# Patient Record
Sex: FEMALE | URBAN - METROPOLITAN AREA
[De-identification: names, ages, dates, MRNs, and addresses within clinical notes are randomized per-mention and may not be internally consistent; named-entity substitution may affect disease eponyms.]

---

## 2021-01-15 ENCOUNTER — APPOINTMENT (OUTPATIENT)
Dept: URBAN - METROPOLITAN AREA CLINIC 254 | Age: 21
Setting detail: DERMATOLOGY
End: 2021-01-15

## 2021-01-15 VITALS — WEIGHT: 240 LBS | HEIGHT: 62 IN | TEMPERATURE: 98.6 F | RESPIRATION RATE: 16 BRPM

## 2021-01-15 DIAGNOSIS — Q819 OTHER SPECIFIED ANOMALIES OF SKIN: ICD-10-CM

## 2021-01-15 DIAGNOSIS — L71.8 OTHER ROSACEA: ICD-10-CM

## 2021-01-15 DIAGNOSIS — Q826 OTHER SPECIFIED ANOMALIES OF SKIN: ICD-10-CM

## 2021-01-15 DIAGNOSIS — Q828 OTHER SPECIFIED ANOMALIES OF SKIN: ICD-10-CM

## 2021-01-15 PROBLEM — L85.8 OTHER SPECIFIED EPIDERMAL THICKENING: Status: ACTIVE | Noted: 2021-01-15

## 2021-01-15 PROCEDURE — 99204 OFFICE O/P NEW MOD 45 MIN: CPT

## 2021-01-15 PROCEDURE — OTHER PRESCRIPTION: OTHER

## 2021-01-15 PROCEDURE — OTHER COUNSELING: OTHER

## 2021-01-15 RX ORDER — IVERMECTIN 10 MG/G
1% CREAM TOPICAL QD
Qty: 1 | Refills: 3 | Status: ERX | COMMUNITY
Start: 2021-01-15

## 2021-01-15 RX ORDER — SODIUM SULFACETAMIDE, SULFUR 50; 100 MG/G; MG/G
LOTION TOPICAL BID
Qty: 1 | Refills: 3 | Status: ERX | COMMUNITY
Start: 2021-01-15

## 2021-01-15 RX ORDER — UREA 410 MG/G
41% CREAM TOPICAL QD
Qty: 1 | Refills: 3 | Status: ERX | COMMUNITY
Start: 2021-01-15

## 2021-01-15 RX ORDER — METRONIDAZOLE 10 MG/G
GEL TOPICAL QHS
Qty: 1 | Refills: 3 | Status: ERX | COMMUNITY
Start: 2021-01-15

## 2021-01-15 RX ORDER — TRETIONIN 0.25 MG/G
0.025% CREAM TOPICAL QHS
Qty: 1 | Refills: 3 | Status: ERX | COMMUNITY
Start: 2021-01-15

## 2021-01-15 RX ORDER — AZELAIC ACID 0.2 G/G
20% CREAM CUTANEOUS QHS
Qty: 1 | Refills: 3 | Status: ERX | COMMUNITY
Start: 2021-01-15

## 2021-01-15 ASSESSMENT — BSA RASH: BSA RASH: 18

## 2021-01-15 ASSESSMENT — LOCATION SIMPLE DESCRIPTION DERM
LOCATION SIMPLE: LEFT UPPER ARM
LOCATION SIMPLE: RIGHT UPPER ARM
LOCATION SIMPLE: LEFT FOREARM
LOCATION SIMPLE: LEFT CHEEK
LOCATION SIMPLE: RIGHT FOREARM
LOCATION SIMPLE: RIGHT CHEEK

## 2021-01-15 ASSESSMENT — LOCATION DETAILED DESCRIPTION DERM
LOCATION DETAILED: RIGHT INFERIOR CENTRAL MALAR CHEEK
LOCATION DETAILED: LEFT SUPERIOR LATERAL BUCCAL CHEEK
LOCATION DETAILED: RIGHT PROXIMAL DORSAL FOREARM
LOCATION DETAILED: LEFT PROXIMAL DORSAL FOREARM
LOCATION DETAILED: RIGHT SUPERIOR LATERAL BUCCAL CHEEK
LOCATION DETAILED: RIGHT ANTERIOR DISTAL UPPER ARM
LOCATION DETAILED: LEFT INFERIOR CENTRAL MALAR CHEEK
LOCATION DETAILED: LEFT ANTERIOR DISTAL UPPER ARM

## 2021-01-15 ASSESSMENT — SEVERITY ASSESSMENT: SEVERITY: MILD TO MODERATE

## 2021-01-15 ASSESSMENT — LOCATION ZONE DERM
LOCATION ZONE: FACE
LOCATION ZONE: ARM

## 2021-01-15 ASSESSMENT — SEVERITY ASSESSMENT OVERALL AMONG ALL PATIENTS
IN YOUR EXPERIENCE, AMONG ALL PATIENTS YOU HAVE SEEN WITH THIS CONDITION, HOW SEVERE IS THIS PATIENT'S CONDITION?: MODERATE

## 2021-01-28 ENCOUNTER — RX ONLY (RX ONLY)
Age: 21
End: 2021-01-28

## 2021-01-28 RX ORDER — AZELAIC ACID 0.2 G/G
20% CREAM CUTANEOUS QHS
Qty: 1 | Refills: 3 | Status: ERX

## 2021-02-25 ENCOUNTER — APPOINTMENT (OUTPATIENT)
Dept: URBAN - METROPOLITAN AREA CLINIC 254 | Age: 21
Setting detail: DERMATOLOGY
End: 2021-03-01

## 2021-02-25 VITALS — HEIGHT: 62 IN | WEIGHT: 260 LBS

## 2021-02-25 DIAGNOSIS — L73.2 HIDRADENITIS SUPPURATIVA: ICD-10-CM

## 2021-02-25 DIAGNOSIS — L71.8 OTHER ROSACEA: ICD-10-CM

## 2021-02-25 PROCEDURE — OTHER ORDER TESTS: OTHER

## 2021-02-25 PROCEDURE — 36415 COLL VENOUS BLD VENIPUNCTURE: CPT

## 2021-02-25 PROCEDURE — OTHER HUMIRA INITIATION: OTHER

## 2021-02-25 PROCEDURE — 99214 OFFICE O/P EST MOD 30 MIN: CPT | Mod: 25

## 2021-02-25 PROCEDURE — OTHER VENIPUNCTURE: OTHER

## 2021-02-25 PROCEDURE — OTHER PRESCRIPTION: OTHER

## 2021-02-25 PROCEDURE — OTHER COUNSELING: OTHER

## 2021-02-25 RX ORDER — ADALIMUMAB 4 MG/ML
KIT INJECTION
Qty: 1 | Refills: 0 | Status: ERX | COMMUNITY
Start: 2021-02-25

## 2021-02-25 RX ORDER — ADALIMUMAB 40MG/0.4ML
40MG KIT SUBCUTANEOUS
Qty: 2 | Refills: 0 | Status: ERX | COMMUNITY
Start: 2021-02-25

## 2021-02-25 RX ORDER — DOXYCYCLINE 100 MG/1
CAPSULE ORAL BID
Qty: 60 | Refills: 1 | Status: ERX | COMMUNITY
Start: 2021-02-25

## 2021-02-25 RX ORDER — OXYMETAZOLINE HYDROCHLORIDE 1 G/100G
CREAM TOPICAL PRN
Qty: 1 | Refills: 3 | Status: ERX | COMMUNITY
Start: 2021-02-25

## 2021-02-25 ASSESSMENT — LOCATION SIMPLE DESCRIPTION DERM
LOCATION SIMPLE: LEFT AXILLARY VAULT
LOCATION SIMPLE: RIGHT CHEEK
LOCATION SIMPLE: CHEST
LOCATION SIMPLE: LEFT UPPER ARM
LOCATION SIMPLE: LEFT CHEEK
LOCATION SIMPLE: RIGHT AXILLARY VAULT

## 2021-02-25 ASSESSMENT — LOCATION DETAILED DESCRIPTION DERM
LOCATION DETAILED: RIGHT AXILLARY VAULT
LOCATION DETAILED: LEFT ANTECUBITAL SKIN
LOCATION DETAILED: LEFT INFERIOR CENTRAL MALAR CHEEK
LOCATION DETAILED: RIGHT INFERIOR CENTRAL MALAR CHEEK
LOCATION DETAILED: LOWER STERNUM
LOCATION DETAILED: LEFT AXILLARY VAULT

## 2021-02-25 ASSESSMENT — LOCATION ZONE DERM
LOCATION ZONE: TRUNK
LOCATION ZONE: ARM
LOCATION ZONE: AXILLAE
LOCATION ZONE: FACE

## 2021-02-25 NOTE — PROCEDURE: VENIPUNCTURE
Bill For Individual Tests Below?: no
Detail Level: None
Number Of Tubes Drawn: 3
Venipuncture Paragraph: An alcohol pad was applied to the venipuncture site. Venipuncture was performed using a butterfly needle. Pressure and a bandaid was applied to the site. No complications were noted.

## 2021-02-25 NOTE — PROCEDURE: HUMIRA INITIATION
Is Dapsone Contraindicated?: No
Pregnancy And Lactation Warning Text: This medication is Pregnancy Category B and is considered safe during pregnancy. It is unknown if this medication is excreted in breast milk.
Diagnosis (Required): Hidradenitis Suppurativa
Detail Level: Simple
Humira Dosing: 160 mg SC day 1, 80 mg SC day 14, then 40 mg SC every other week starting on day 29
Humira Monitoring Guidelines: - Discussed that a panel of labs need to be drawn at baseline and monitored periodically. \\n- Additionally, the patient will need to be screened for tuberculosis and Hepatitis B annually including at baseline.\\n- It is important to not take drug holidays unless otherwise instructed as this can affect a patient's ability to recapture the same degree of skin clearance upon restarting.

## 2021-03-02 ENCOUNTER — AMBULATORY - HEALTHEAST (OUTPATIENT)
Dept: NURSING | Facility: CLINIC | Age: 21
End: 2021-03-02

## 2021-03-08 ENCOUNTER — APPOINTMENT (OUTPATIENT)
Dept: URBAN - METROPOLITAN AREA CLINIC 252 | Age: 21
Setting detail: DERMATOLOGY
End: 2021-03-08

## 2021-03-08 DIAGNOSIS — Z41.9 ENCOUNTER FOR PROCEDURE FOR PURPOSES OTHER THAN REMEDYING HEALTH STATE, UNSPECIFIED: ICD-10-CM

## 2021-03-08 PROCEDURE — OTHER JUVEDERM VOLLURE XC INJECTION: OTHER

## 2021-03-08 NOTE — PROCEDURE: JUVEDERM VOLLURE XC INJECTION
Price (Use Numbers Only, No Special Characters Or $): 147 Price (Use Numbers Only, No Special Characters Or $): 888

## 2021-03-08 NOTE — PROCEDURE: JUVEDERM VOLLURE XC INJECTION
Post-Care Instructions: - Patient was instructed to apply ice to reduce swelling. Ice should not be appleid directly to the skin.\\n\\n(1 Vollure 1.0cc syringe)\\n\\nPrice: $750

## 2021-03-08 NOTE — HPI: COSMETIC (FILLERS)
Additional History: First filler today. She reports having larger lips in morning that deflate throughout the day.

## 2021-03-08 NOTE — PROCEDURE: JUVEDERM VOLLURE XC INJECTION
Consent: - Verbal and written consent obtained after discussion of risks.\\n- Risks include but not limited to bruising, bleeding, asymmetry, infection, allergic reaction, palpation of product, lumps, scar formation, Anna effect, incomplete augmentation, temporary nature, procedural pain, ulceration, and necrosis of skin.

## 2021-03-09 RX ORDER — ADALIMUMAB 40MG/0.4ML
KIT SUBCUTANEOUS
Qty: 2 | Refills: 0 | Status: ERX

## 2021-03-09 RX ORDER — ADALIMUMAB 4 MG/ML
KIT INJECTION
Qty: 1 | Refills: 0 | Status: ERX

## 2021-03-23 ENCOUNTER — AMBULATORY - HEALTHEAST (OUTPATIENT)
Dept: NURSING | Facility: CLINIC | Age: 21
End: 2021-03-23

## 2021-04-09 ENCOUNTER — RX ONLY (RX ONLY)
Age: 21
End: 2021-04-09

## 2021-04-09 RX ORDER — ADALIMUMAB 40MG/0.4ML
KIT SUBCUTANEOUS
Qty: 2 | Refills: 1 | Status: ERX

## 2021-04-15 ENCOUNTER — APPOINTMENT (OUTPATIENT)
Dept: URBAN - METROPOLITAN AREA CLINIC 254 | Age: 21
Setting detail: DERMATOLOGY
End: 2021-04-16

## 2021-04-15 DIAGNOSIS — L73.2 HIDRADENITIS SUPPURATIVA: ICD-10-CM

## 2021-04-15 DIAGNOSIS — L64.8 OTHER ANDROGENIC ALOPECIA: ICD-10-CM

## 2021-04-15 PROCEDURE — 99213 OFFICE O/P EST LOW 20 MIN: CPT

## 2021-04-15 PROCEDURE — OTHER HUMIRA MONITORING: OTHER

## 2021-04-15 PROCEDURE — OTHER PRESCRIPTION: OTHER

## 2021-04-15 PROCEDURE — OTHER COUNSELING: OTHER

## 2021-04-15 RX ORDER — SPIRONOLACTONE 50 MG/1
50 MG TABLET, FILM COATED ORAL TWICE A DAY
Qty: 180 | Refills: 1 | Status: ERX | COMMUNITY
Start: 2021-04-15

## 2021-04-15 RX ORDER — ADALIMUMAB 40MG/0.4ML
40MG KIT SUBCUTANEOUS
Qty: 6 | Refills: 0 | Status: ERX

## 2021-04-15 ASSESSMENT — LOCATION SIMPLE DESCRIPTION DERM: LOCATION SIMPLE: LEFT SCALP

## 2021-04-15 ASSESSMENT — LOCATION DETAILED DESCRIPTION DERM: LOCATION DETAILED: LEFT MEDIAL FRONTAL SCALP

## 2021-04-15 ASSESSMENT — LOCATION ZONE DERM: LOCATION ZONE: SCALP

## 2021-04-23 ENCOUNTER — RX ONLY (RX ONLY)
Age: 21
End: 2021-04-23

## 2021-04-23 RX ORDER — BIMATOPROST 0.3 MG/ML
0.03% SOLUTION/ DROPS OPHTHALMIC QHS
Qty: 1 | Refills: 5 | Status: ERX | COMMUNITY
Start: 2021-04-23

## 2021-05-10 ENCOUNTER — APPOINTMENT (OUTPATIENT)
Dept: URBAN - METROPOLITAN AREA CLINIC 252 | Age: 21
Setting detail: DERMATOLOGY
End: 2021-05-10

## 2021-05-10 DIAGNOSIS — Z41.9 ENCOUNTER FOR PROCEDURE FOR PURPOSES OTHER THAN REMEDYING HEALTH STATE, UNSPECIFIED: ICD-10-CM

## 2021-05-10 PROCEDURE — OTHER BOTOX (U OR CC): OTHER

## 2021-05-10 NOTE — HPI: COSMETIC (BOTOX)
Additional History: She had gotten 6u Botox in past that worked well on upper lip for her gummy smile.

## 2021-05-10 NOTE — PROCEDURE: BOTOX (U OR CC)
Post-Care Instructions: - Skin was cleansed with 70% isopropyl alcohol prior to injection today.\\n- Patient was instructed to not lie down for touch area for 6 hrs.\\n\\nTOTAL BOTOX USED TODAY: 4 units\\n\\nCOLOR KEY FOR DIAGRAM BELOW:\\nRed = 1/2 unit of Botox per injection\\nOrange = 1 units of Botox per injection\\nYellow = 2 units of Botox per injection\\nGreen = 3 units of Botox per injection\\nBlue = 4 units of Botox per injection\\nBrown = 5 units of Botox per injection\\nBlack = 6 units of Botox per injection

## 2021-07-16 ENCOUNTER — RX ONLY (RX ONLY)
Age: 21
End: 2021-07-16

## 2021-07-16 RX ORDER — DAPSONE 50 MG/G
GEL TOPICAL
Qty: 60 | Refills: 11 | Status: ERX | COMMUNITY
Start: 2021-07-16

## 2021-09-24 ENCOUNTER — APPOINTMENT (OUTPATIENT)
Dept: URBAN - METROPOLITAN AREA CLINIC 252 | Age: 21
Setting detail: DERMATOLOGY
End: 2021-09-24

## 2021-10-07 ENCOUNTER — RX ONLY (RX ONLY)
Age: 21
End: 2021-10-07

## 2021-10-07 RX ORDER — DOXYCYCLINE 100 MG/1
CAPSULE ORAL
Qty: 180 | Refills: 1 | Status: ERX | COMMUNITY
Start: 2021-10-07

## 2021-10-16 ENCOUNTER — HEALTH MAINTENANCE LETTER (OUTPATIENT)
Age: 21
End: 2021-10-16

## 2021-11-23 ENCOUNTER — APPOINTMENT (OUTPATIENT)
Dept: URBAN - METROPOLITAN AREA CLINIC 252 | Age: 21
Setting detail: DERMATOLOGY
End: 2021-11-23

## 2021-11-23 ENCOUNTER — RX ONLY (RX ONLY)
Age: 21
End: 2021-11-23

## 2021-11-23 RX ORDER — DOXYCYCLINE 50 MG/1
CAPSULE ORAL
Qty: 60 | Refills: 1 | Status: ERX | COMMUNITY
Start: 2021-11-23

## 2021-12-14 ENCOUNTER — APPOINTMENT (OUTPATIENT)
Dept: URBAN - METROPOLITAN AREA CLINIC 252 | Age: 21
Setting detail: DERMATOLOGY
End: 2021-12-20

## 2021-12-14 DIAGNOSIS — Z41.9 ENCOUNTER FOR PROCEDURE FOR PURPOSES OTHER THAN REMEDYING HEALTH STATE, UNSPECIFIED: ICD-10-CM

## 2021-12-14 PROCEDURE — OTHER JUVEDERM VOLLURE XC INJECTION: OTHER

## 2021-12-14 NOTE — PROCEDURE: JUVEDERM VOLLURE XC INJECTION
Map Statment: See Attach Map for Complete Details
Brows Filler  Volume In Cc: 0
Filler: Juvederm Vollure XC
Include Cannula Information In Note?: No
Number Of Syringes (Required For Inventory): 1
Including Pricing Information In The Note: Yes
Detail Level: Detailed
Post-Care Instructions: - Patient was instructed to apply ice to reduce swelling. Ice should not be appleid directly to the skin.\\n\\n(1 Vollure 1.0cc syringe)\\n\\nPrice: $750
Procedural Text: - All areas were cleansed with chlorhexidine wipes prior to injection.\\n- Product was aspirated with syringe prior to each injection to reduce risk of intravascular injection.\\n- Patient was instructed to call the clinic immediately should any pain develop other than the expected tenderness at injection sites.\\n- Advised to expect swelling which can take 1-2 weeks to resolve completely. As a result any necessary touch-ups will want to be made no sooner than 2 weeks after injection.
Consent: - Verbal and written consent obtained after discussion of risks.\\n- Risks include but not limited to bruising, bleeding, asymmetry, infection, allergic reaction, palpation of product, lumps, scar formation, Anna effect, incomplete augmentation, temporary nature, procedural pain, ulceration, and necrosis of skin.

## 2022-01-04 ENCOUNTER — RX ONLY (RX ONLY)
Age: 22
End: 2022-01-04

## 2022-01-04 RX ORDER — RUXOLITINIB 15 MG/G
CREAM TOPICAL
Qty: 60 | Refills: 5 | Status: ERX | COMMUNITY
Start: 2022-01-04

## 2022-02-09 ENCOUNTER — APPOINTMENT (OUTPATIENT)
Dept: URBAN - METROPOLITAN AREA CLINIC 252 | Age: 22
Setting detail: DERMATOLOGY
End: 2022-02-09

## 2022-02-09 VITALS — WEIGHT: 240 LBS | HEIGHT: 62 IN

## 2022-03-02 ENCOUNTER — RX ONLY (RX ONLY)
Age: 22
End: 2022-03-02

## 2022-03-02 RX ORDER — AZITHROMYCIN DIHYDRATE 250 MG/1
TABLET, FILM COATED ORAL
Qty: 6 | Refills: 0 | Status: ERX | COMMUNITY
Start: 2022-03-02

## 2022-03-10 ENCOUNTER — IMMUNIZATION (OUTPATIENT)
Dept: NURSING | Facility: CLINIC | Age: 22
End: 2022-03-10
Payer: COMMERCIAL

## 2022-03-10 PROCEDURE — 0054A COVID-19,PF,PFIZER (12+ YRS): CPT

## 2022-03-10 PROCEDURE — 91305 COVID-19,PF,PFIZER (12+ YRS): CPT

## 2022-03-25 ENCOUNTER — RX ONLY (RX ONLY)
Age: 22
End: 2022-03-25

## 2022-03-25 ENCOUNTER — APPOINTMENT (OUTPATIENT)
Dept: URBAN - METROPOLITAN AREA CLINIC 252 | Age: 22
Setting detail: DERMATOLOGY
End: 2022-03-25

## 2022-03-25 RX ORDER — GENTAMICIN SULFATE 0.3 %
DROPS OPHTHALMIC (EYE)
Qty: 15 | Refills: 0 | Status: ERX | COMMUNITY
Start: 2022-03-25

## 2022-04-20 ENCOUNTER — RX ONLY (RX ONLY)
Age: 22
End: 2022-04-20

## 2022-04-20 RX ORDER — CEPHALEXIN 500 MG/1
500 MG CAPSULE ORAL TID
Qty: 30 | Refills: 0 | Status: ERX | COMMUNITY
Start: 2022-04-20

## 2022-04-29 ENCOUNTER — RX ONLY (RX ONLY)
Age: 22
End: 2022-04-29

## 2022-04-29 RX ORDER — CLINDAMYCIN PHOSPHATE 10 MG/G
GEL TOPICAL
Qty: 60 | Refills: 11 | COMMUNITY
Start: 2022-04-29

## 2022-05-03 ENCOUNTER — RX ONLY (RX ONLY)
Age: 22
End: 2022-05-03

## 2022-05-03 RX ORDER — DOXYCYCLINE 100 MG/1
CAPSULE ORAL
Qty: 20 | Refills: 0 | COMMUNITY
Start: 2022-05-03

## 2022-05-03 RX ORDER — SPIRONOLACTONE 100 MG/1
TABLET, FILM COATED ORAL
Qty: 180 | Refills: 4 | COMMUNITY
Start: 2022-05-03

## 2022-05-03 RX ORDER — DOXYCYCLINE 100 MG/1
CAPSULE ORAL BID
Qty: 180 | Refills: 4 | Status: CANCELLED
Stop reason: CLARIF

## 2022-06-10 ENCOUNTER — APPOINTMENT (OUTPATIENT)
Dept: URBAN - METROPOLITAN AREA CLINIC 252 | Age: 22
Setting detail: DERMATOLOGY
End: 2022-06-10

## 2022-06-10 DIAGNOSIS — Z41.9 ENCOUNTER FOR PROCEDURE FOR PURPOSES OTHER THAN REMEDYING HEALTH STATE, UNSPECIFIED: ICD-10-CM

## 2022-06-10 PROCEDURE — OTHER JUVEDERM ULTRA PLUS XC INJECTION: OTHER

## 2022-06-10 NOTE — PROCEDURE: JUVEDERM ULTRA PLUS XC INJECTION
Filler: Juvederm Ultra Plus XC
Price (Use Numbers Only, No Special Characters Or $): 0
Number Of Syringes (Required For Inventory): 1
Use Map Statement For Sites (Optional): Yes
Include Cannula Information In Note?: No
Consent: - Verbal and written consent obtained after discussion of risks.\\n- Risks include but not limited to bruising, bleeding, asymmetry, infection, allergic reaction, palpation of product, lumps, scar formation, Anna effect, incomplete augmentation, temporary nature, procedural pain, ulceration, and necrosis of skin.
Procedural Text: - All areas were cleansed with chlorhexidine wipes prior to injection.\\n- Product was aspirated with syringe prior to each injection to reduce risk of intravascular injection.\\n- Patient was instructed to call the clinic immediately should any pain develop other than the expected tenderness at injection sites.\\n- Advised to expect swelling which can take 1-2 weeks to resolve completely. As a result any necessary touch-ups will want to be made no sooner than 2 weeks after injection.
Detail Level: Detailed
Map Statment: See Attach Map for Complete Details
Post-Care Instructions: - Patient was instructed to apply ice to reduce swelling. Ice should not be appleid directly to the skin.\\n\\n(1 Juvederm Ultra Plus 1.0cc syringe)\\n\\nPrice: $700

## 2022-06-20 ENCOUNTER — RX ONLY (RX ONLY)
Age: 22
End: 2022-06-20

## 2022-06-20 RX ORDER — SPIRONOLACTONE 100 MG/1
TABLET, FILM COATED ORAL
Qty: 180 | Refills: 4

## 2022-09-26 ENCOUNTER — RX ONLY (RX ONLY)
Age: 22
End: 2022-09-26

## 2022-09-26 RX ORDER — PREDNISONE 20 MG/1
20 TABLET ORAL QD
Qty: 10 | Refills: 0 | Status: ERX | COMMUNITY
Start: 2022-09-26

## 2022-09-27 ENCOUNTER — RX ONLY (RX ONLY)
Age: 22
End: 2022-09-27

## 2022-09-27 RX ORDER — OXYMETAZOLINE HYDROCHLORIDE 1 G/100G
CREAM TOPICAL
Qty: 30 | Refills: 11 | Status: ERX | COMMUNITY
Start: 2022-09-27

## 2022-10-01 ENCOUNTER — HEALTH MAINTENANCE LETTER (OUTPATIENT)
Age: 22
End: 2022-10-01

## 2022-10-24 ENCOUNTER — RX ONLY (RX ONLY)
Age: 22
End: 2022-10-24

## 2022-10-24 RX ORDER — MONTELUKAST SODIUM 10 MG/1
TABLET ORAL
Qty: 90 | Refills: 3 | Status: ERX | COMMUNITY
Start: 2022-10-24

## 2023-01-10 ENCOUNTER — RX ONLY (RX ONLY)
Age: 23
End: 2023-01-10

## 2023-01-10 RX ORDER — CLINDAMYCIN PHOSPHATE 10 MG/G
GEL TOPICAL
Qty: 60 | Refills: 11 | Status: ERX | COMMUNITY
Start: 2023-01-10

## 2023-02-04 ENCOUNTER — HEALTH MAINTENANCE LETTER (OUTPATIENT)
Age: 23
End: 2023-02-04

## 2023-03-23 ENCOUNTER — RX ONLY (RX ONLY)
Age: 23
End: 2023-03-23

## 2023-03-23 RX ORDER — IVERMECTIN 10 MG/G
CREAM TOPICAL
Qty: 90 | Refills: 3 | Status: ERX | COMMUNITY
Start: 2023-03-23

## 2023-09-25 ENCOUNTER — APPOINTMENT (OUTPATIENT)
Dept: URBAN - METROPOLITAN AREA CLINIC 256 | Age: 23
Setting detail: DERMATOLOGY
End: 2023-09-25

## 2023-09-25 DIAGNOSIS — Z41.9 ENCOUNTER FOR PROCEDURE FOR PURPOSES OTHER THAN REMEDYING HEALTH STATE, UNSPECIFIED: ICD-10-CM

## 2023-09-25 PROCEDURE — OTHER JUVEDERM VOLLURE XC INJECTION: OTHER

## 2023-09-25 PROCEDURE — OTHER BOTOX: OTHER

## 2023-09-25 ASSESSMENT — LOCATION DETAILED DESCRIPTION DERM: LOCATION DETAILED: LEFT CENTRAL OCCIPITAL SCALP

## 2023-09-25 ASSESSMENT — LOCATION SIMPLE DESCRIPTION DERM: LOCATION SIMPLE: SCALP

## 2023-09-25 ASSESSMENT — LOCATION ZONE DERM: LOCATION ZONE: SCALP

## 2023-09-25 NOTE — PROCEDURE: JUVEDERM VOLLURE XC INJECTION
Post-Care Instructions: Patient instructed to apply ice to reduce swelling.
Use Map Statement For Sites (Optional): No
Anesthesia Volume In Cc: 0.5
Nasolabial Folds Filler Volume In Cc: 0
Show Inventory Tab: Show
Map Statment: See Attach Map for Complete Details
Anesthesia Type: 1% lidocaine with epinephrine
Detail Level: Detailed
Procedural Text: The filler was administered to the treatment areas noted above.
Number Of Syringes (Required For Inventory): 1
Filler: Juvederm Vollure XC
Additional Anesthesia Volume In Cc: 6
Consent: Written consent obtained. Risks include but not limited to bruising, beading, irregular texture, ulceration, infection, allergic reaction, scar formation, incomplete augmentation, temporary nature, procedural pain.

## 2023-09-25 NOTE — PROCEDURE: BOTOX
R Brow Units: 0
Show Depressor Anguli Units: Yes
Show Lcl Units: No
Periorbital Skin Units: 6
Detail Level: Detailed
Consent: Written consent obtained. Risks include but not limited to lid/brow ptosis, bruising, swelling, diplopia, temporary effect, incomplete chemical denervation.
Additional Area 2 Location: lip flip
Incrementing Botox Units: By 0.1 Units
Post-Care Instructions: Patient instructed to not lie down for 4 hours and limit physical activity for 24 hours.
Additional Area 1 Location: jawline
Dilution (U/0.1 Cc): 4
Show Inventory Tab: Show

## 2023-09-28 ENCOUNTER — RX ONLY (RX ONLY)
Age: 23
End: 2023-09-28

## 2023-09-28 RX ORDER — METRONIDAZOLE 7.5 MG/G
CREAM TOPICAL
Qty: 45 | Refills: 11 | Status: ERX | COMMUNITY
Start: 2023-09-28

## 2023-09-28 RX ORDER — KETOCONAZOLE 20 MG/G
CREAM TOPICAL
Qty: 30 | Refills: 5 | Status: ERX | COMMUNITY
Start: 2023-09-28

## 2023-09-28 RX ORDER — SPIRONOLACTONE 100 MG/1
TABLET, FILM COATED ORAL
Qty: 180 | Refills: 4 | Status: ERX | COMMUNITY
Start: 2023-09-28

## 2023-09-28 RX ORDER — CEPHALEXIN 500 MG/1
TABLET ORAL
Qty: 180 | Refills: 1 | Status: ERX | COMMUNITY
Start: 2023-09-28

## 2023-09-28 RX ORDER — CLINDAMYCIN PHOSPHATE 10 MG/G
GEL TOPICAL
Qty: 60 | Refills: 11 | Status: ERX | COMMUNITY
Start: 2023-09-28

## 2023-09-28 RX ORDER — KETOCONAZOLE 20 MG/ML
SHAMPOO, SUSPENSION TOPICAL
Qty: 120 | Refills: 11 | Status: ERX | COMMUNITY
Start: 2023-09-28

## 2023-09-28 RX ORDER — DAPSONE 75 MG/G
GEL TOPICAL
Qty: 60 | Refills: 11 | Status: ERX | COMMUNITY
Start: 2023-09-28

## 2023-09-28 RX ORDER — TRETIONIN 0.5 MG/G
0.05% CREAM TOPICAL
Qty: 45 | Refills: 11 | Status: ERX | COMMUNITY
Start: 2023-09-28

## 2023-10-07 ENCOUNTER — APPOINTMENT (OUTPATIENT)
Dept: URBAN - METROPOLITAN AREA CLINIC 255 | Age: 23
Setting detail: DERMATOLOGY
End: 2023-10-07

## 2023-10-07 DIAGNOSIS — N39.0 URINARY TRACT INFECTION, SITE NOT SPECIFIED: ICD-10-CM

## 2023-10-07 PROCEDURE — OTHER PRESCRIPTION: OTHER

## 2023-10-07 RX ORDER — SULFAMETHOXAZOLE AND TRIMETHOPRIM 800; 160 MG/1; MG/1
TABLET ORAL BID
Qty: 6 | Refills: 0 | Status: ERX | COMMUNITY
Start: 2023-10-07

## 2024-03-03 ENCOUNTER — HEALTH MAINTENANCE LETTER (OUTPATIENT)
Age: 24
End: 2024-03-03

## 2025-03-15 ENCOUNTER — HEALTH MAINTENANCE LETTER (OUTPATIENT)
Age: 25
End: 2025-03-15